# Patient Record
Sex: MALE | Race: ASIAN | NOT HISPANIC OR LATINO
[De-identification: names, ages, dates, MRNs, and addresses within clinical notes are randomized per-mention and may not be internally consistent; named-entity substitution may affect disease eponyms.]

---

## 2024-01-09 PROBLEM — Z00.00 ENCOUNTER FOR PREVENTIVE HEALTH EXAMINATION: Status: ACTIVE | Noted: 2024-01-09

## 2024-01-26 ENCOUNTER — APPOINTMENT (OUTPATIENT)
Dept: UROLOGY | Facility: CLINIC | Age: 64
End: 2024-01-26
Payer: COMMERCIAL

## 2024-01-26 VITALS
OXYGEN SATURATION: 96 % | TEMPERATURE: 98 F | BODY MASS INDEX: 25.27 KG/M2 | HEIGHT: 64 IN | RESPIRATION RATE: 16 BRPM | SYSTOLIC BLOOD PRESSURE: 151 MMHG | HEART RATE: 66 BPM | WEIGHT: 148 LBS | DIASTOLIC BLOOD PRESSURE: 83 MMHG

## 2024-01-26 DIAGNOSIS — Z87.891 PERSONAL HISTORY OF NICOTINE DEPENDENCE: ICD-10-CM

## 2024-01-26 DIAGNOSIS — Z86.39 PERSONAL HISTORY OF OTHER ENDOCRINE, NUTRITIONAL AND METABOLIC DISEASE: ICD-10-CM

## 2024-01-26 DIAGNOSIS — Z78.9 OTHER SPECIFIED HEALTH STATUS: ICD-10-CM

## 2024-01-26 DIAGNOSIS — Z80.49 FAMILY HISTORY OF MALIGNANT NEOPLASM OF OTHER GENITAL ORGANS: ICD-10-CM

## 2024-01-26 PROCEDURE — 99204 OFFICE O/P NEW MOD 45 MIN: CPT

## 2024-01-26 RX ORDER — GLIMEPIRIDE 2 MG/1
2 TABLET ORAL
Refills: 0 | Status: ACTIVE | COMMUNITY

## 2024-01-26 RX ORDER — LOSARTAN POTASSIUM 25 MG/1
25 TABLET, FILM COATED ORAL
Refills: 0 | Status: ACTIVE | COMMUNITY

## 2024-01-26 RX ORDER — METFORMIN HYDROCHLORIDE 1000 MG/1
1000 TABLET, COATED ORAL
Refills: 0 | Status: ACTIVE | COMMUNITY

## 2024-01-26 RX ORDER — ATORVASTATIN CALCIUM 10 MG/1
10 TABLET, FILM COATED ORAL
Refills: 0 | Status: ACTIVE | COMMUNITY

## 2024-01-26 NOTE — HISTORY OF PRESENT ILLNESS
[FreeTextEntry1] : 64 yo Pashto speaking M presented to outside hospital ([facility] [place]) with severe right flank pain, vomiting  CT (1/6/24) showed 1cm right mid ureteral stone with hydronephrosis Initially discharged but returned to ER the next day due to symptoms and eventually had ureteral stent inserted Feeling much better some intermittent hematuria no fever or chills Drinks 1 bottle of water, 1 cup of coffee daily

## 2024-01-26 NOTE — ASSESSMENT
[FreeTextEntry1] : 64 yo M with right ureteral stone s/p stent insertion at outside hospital  - Reviewed records from hospitalization including CT report stating mid ureteral stone as well as normal BMP and CBC - -I discussed the different treatment modalities for nephrolithiasis with the patient, including medical management, spontaneous stone passage, percutaneous stone extraction, extracorporeal shock wave lithotripsy, and ureteroscopy with laser lithotripsy and stone extraction. Given the size and location of the stone, I recommend and the patient opted to proceed with ureteroscopy. The risks, benefits, and alternatives to ureteroscopy were discussed with the patient, including but not limited to pain, infection, bleeding, bladder injury, ureteral injury, renal injury, and treatment failure. I also discussed the possible need for a temporary ureteral stent. I discussed the possible side effects of a temporary ureteral stent, including flank pain, hematuria, and bladder spasms. The patient understands that a stent is a temporary implant that must be removed in the future. The patient wishes to proceed and we will schedule the surgery for the near future.  - WIll need CT prior to procedure - UA and culture

## 2024-01-26 NOTE — REVIEW OF SYSTEMS
[Abdominal Pain] : abdominal pain [Blood in urine that you can see] : blood visible in urine [Bladder fullness after urinating] : bladder fullness after urinating [History of kidney stones] : history of kidney stones [Muscle Weakness] : muscle weakness [Negative] : Heme/Lymph

## 2024-01-31 LAB
APPEARANCE: ABNORMAL
BACTERIA UR CULT: NORMAL
BACTERIA: NEGATIVE /HPF
BILIRUBIN URINE: NEGATIVE
BLOOD URINE: ABNORMAL
CALCIUM OXALATE CRYSTALS: PRESENT
COLOR: ABNORMAL
GLUCOSE QUALITATIVE U: >=1000 MG/DL
KETONES URINE: ABNORMAL MG/DL
LEUKOCYTE ESTERASE URINE: ABNORMAL
MICROSCOPIC-UA: NORMAL
NITRITE URINE: NEGATIVE
PH URINE: 7.5
PROTEIN URINE: 100 MG/DL
RED BLOOD CELLS URINE: ABNORMAL /HPF
SPECIFIC GRAVITY URINE: 1.02
URINE COMMENTS: NORMAL
UROBILINOGEN URINE: 1 MG/DL
WHITE BLOOD CELLS URINE: 15 /HPF

## 2024-02-07 ENCOUNTER — OUTPATIENT (OUTPATIENT)
Dept: OUTPATIENT SERVICES | Facility: HOSPITAL | Age: 64
LOS: 1 days | End: 2024-02-07
Payer: COMMERCIAL

## 2024-02-07 VITALS
RESPIRATION RATE: 17 BRPM | DIASTOLIC BLOOD PRESSURE: 80 MMHG | OXYGEN SATURATION: 100 % | WEIGHT: 147.93 LBS | TEMPERATURE: 98 F | HEIGHT: 67 IN | SYSTOLIC BLOOD PRESSURE: 128 MMHG | HEART RATE: 65 BPM

## 2024-02-07 DIAGNOSIS — E78.5 HYPERLIPIDEMIA, UNSPECIFIED: ICD-10-CM

## 2024-02-07 DIAGNOSIS — N20.0 CALCULUS OF KIDNEY: ICD-10-CM

## 2024-02-07 DIAGNOSIS — E11.9 TYPE 2 DIABETES MELLITUS WITHOUT COMPLICATIONS: ICD-10-CM

## 2024-02-07 DIAGNOSIS — Z90.49 ACQUIRED ABSENCE OF OTHER SPECIFIED PARTS OF DIGESTIVE TRACT: Chronic | ICD-10-CM

## 2024-02-07 DIAGNOSIS — Z01.818 ENCOUNTER FOR OTHER PREPROCEDURAL EXAMINATION: ICD-10-CM

## 2024-02-07 DIAGNOSIS — Z78.9 OTHER SPECIFIED HEALTH STATUS: ICD-10-CM

## 2024-02-07 DIAGNOSIS — I10 ESSENTIAL (PRIMARY) HYPERTENSION: ICD-10-CM

## 2024-02-07 LAB
APPEARANCE UR: ABNORMAL
BACTERIA # UR AUTO: ABNORMAL /HPF
BILIRUB UR-MCNC: NEGATIVE — SIGNIFICANT CHANGE UP
COD CRY URNS QL: PRESENT
COLOR SPEC: SIGNIFICANT CHANGE UP
COMMENT - URINE: SIGNIFICANT CHANGE UP
DIFF PNL FLD: ABNORMAL
GLUCOSE UR QL: >=1000 MG/DL
HYALINE CASTS # UR AUTO: PRESENT
KETONES UR-MCNC: ABNORMAL MG/DL
LEUKOCYTE ESTERASE UR-ACNC: ABNORMAL
NITRITE UR-MCNC: NEGATIVE — SIGNIFICANT CHANGE UP
PH UR: 6 — SIGNIFICANT CHANGE UP (ref 5–8)
PROT UR-MCNC: 100 MG/DL
RBC CASTS # UR COMP ASSIST: ABNORMAL /HPF
SP GR SPEC: 1.02 — SIGNIFICANT CHANGE UP (ref 1–1.03)
UROBILINOGEN FLD QL: 1 MG/DL — SIGNIFICANT CHANGE UP (ref 0.2–1)
WBC UR QL: 15 /HPF — HIGH (ref 0–5)

## 2024-02-07 RX ORDER — SODIUM CHLORIDE 9 MG/ML
3 INJECTION INTRAMUSCULAR; INTRAVENOUS; SUBCUTANEOUS EVERY 8 HOURS
Refills: 0 | Status: DISCONTINUED | OUTPATIENT
Start: 2024-02-14 | End: 2024-02-28

## 2024-02-07 NOTE — H&P PST ADULT - HISTORY OF PRESENT ILLNESS
64 yr old Bulgarian male pleasant accompanied by daughter in law Josef. Pt has history of HTN, HDL diabetes had gone to this ER 1/6/2024 with right flank pain , vomiting and bloody urine. CT Scan showed fright mid ureteral stone with hydronephrosis and right ureteral stent was placed to relieve his symptoms. Pt scheduled for cystoscopy right retrograde pyelogram ureteroscopy with laser lithotripsy and ureteral stent exchange on 2/14/2024. All history obtained via [facility] as .

## 2024-02-07 NOTE — H&P PST ADULT - NSICDXPROCEDURE_GEN_ALL_CORE_FT
PROCEDURES:  Cystoscopy with ureteroscopic laser lithotripsy and insertion of stent 07-Feb-2024 17:10:06  Stephenie Barbosa

## 2024-02-07 NOTE — H&P PST ADULT - NSICDXPASTMEDICALHX_GEN_ALL_CORE_FT
PAST MEDICAL HISTORY:  Calculus of kidney     DM (diabetes mellitus)     HLD (hyperlipidemia)     HTN (hypertension)     Language barrier to communication

## 2024-02-07 NOTE — H&P PST ADULT - PROBLEM SELECTOR PLAN 2
Pt instructed not to take any oral glycemics am of surgery but continue with them post procedure. Monitor FS pre op

## 2024-02-07 NOTE — H&P PST ADULT - PROBLEM SELECTOR PLAN 5
scheduled for cystoscopy right retrograde pyelogram ureteroscopy with laser lithotripsy and ureteral stent exchange.    Pt instructed to be NPO the night before and the morning of surgery. Provided with chlorhexidene 4% solution to wash for 3 days including the morning of surgery. Written instructions reviewed with pt and family member. Escort required upon discharge. Tylenol  only to be used if needed prior to surgery.      HAYDE=3

## 2024-02-07 NOTE — H&P PST ADULT - NSICDXFAMILYHX_GEN_ALL_CORE_FT
FAMILY HISTORY:  Father  Still living? Unknown  Family history of cervical cancer, Age at diagnosis: Age Unknown    Mother  Still living? No  Family history of cervical cancer, Age at diagnosis: Age Unknown

## 2024-02-07 NOTE — H&P PST ADULT - REASON FOR ADMISSION
cystoscopy right retrograde pyelogram ureteroscopy with laser lithotripsy and ureteral stent exchange

## 2024-02-07 NOTE — H&P PST ADULT - PROBLEM SELECTOR PLAN 1
Pt take his Losartan at night and will continue the night before surgery. Monitor vs and pt F/U with PCP

## 2024-02-07 NOTE — H&P PST ADULT - ASSESSMENT
64 yr old Persian male pleasant accompanied by daughter in law Josef. Pt has history of HTN, HDL diabetes had gone to this ER 1/6/2024 with right flank pain , vomiting and bloody urine. CT Scan showed fright mid ureteral stone with hydronephrosis and right ureteral stent was placed to relieve his symptoms. Pt scheduled for cystoscopy right retrograde pyelogram ureteroscopy with laser lithotripsy and ureteral stent exchange on 2/14/2024. All history obtained via [facility] as .

## 2024-02-08 PROCEDURE — G0463: CPT

## 2024-02-09 LAB
CULTURE RESULTS: SIGNIFICANT CHANGE UP
SPECIMEN SOURCE: SIGNIFICANT CHANGE UP

## 2024-02-14 ENCOUNTER — RESULT REVIEW (OUTPATIENT)
Age: 64
End: 2024-02-14

## 2024-02-14 ENCOUNTER — OUTPATIENT (OUTPATIENT)
Dept: OUTPATIENT SERVICES | Facility: HOSPITAL | Age: 64
LOS: 1 days | End: 2024-02-14
Payer: COMMERCIAL

## 2024-02-14 ENCOUNTER — APPOINTMENT (OUTPATIENT)
Age: 64
End: 2024-02-14

## 2024-02-14 ENCOUNTER — TRANSCRIPTION ENCOUNTER (OUTPATIENT)
Age: 64
End: 2024-02-14

## 2024-02-14 VITALS
SYSTOLIC BLOOD PRESSURE: 123 MMHG | WEIGHT: 147.93 LBS | HEIGHT: 67 IN | HEART RATE: 76 BPM | DIASTOLIC BLOOD PRESSURE: 76 MMHG | OXYGEN SATURATION: 99 % | RESPIRATION RATE: 16 BRPM | TEMPERATURE: 98 F

## 2024-02-14 VITALS
OXYGEN SATURATION: 96 % | DIASTOLIC BLOOD PRESSURE: 85 MMHG | SYSTOLIC BLOOD PRESSURE: 137 MMHG | HEART RATE: 67 BPM | RESPIRATION RATE: 14 BRPM | TEMPERATURE: 98 F

## 2024-02-14 DIAGNOSIS — Z90.49 ACQUIRED ABSENCE OF OTHER SPECIFIED PARTS OF DIGESTIVE TRACT: Chronic | ICD-10-CM

## 2024-02-14 DIAGNOSIS — N20.0 CALCULUS OF KIDNEY: ICD-10-CM

## 2024-02-14 DIAGNOSIS — Z01.818 ENCOUNTER FOR OTHER PREPROCEDURAL EXAMINATION: ICD-10-CM

## 2024-02-14 LAB
GLUCOSE BLDC GLUCOMTR-MCNC: 125 MG/DL — HIGH (ref 70–99)
GLUCOSE BLDC GLUCOMTR-MCNC: 145 MG/DL — HIGH (ref 70–99)

## 2024-02-14 PROCEDURE — 74420 UROGRAPHY RTRGR +-KUB: CPT | Mod: 26

## 2024-02-14 PROCEDURE — 82365 CALCULUS SPECTROSCOPY: CPT

## 2024-02-14 PROCEDURE — C1758: CPT

## 2024-02-14 PROCEDURE — 52356 CYSTO/URETERO W/LITHOTRIPSY: CPT | Mod: RT

## 2024-02-14 PROCEDURE — 76000 FLUOROSCOPY <1 HR PHYS/QHP: CPT

## 2024-02-14 PROCEDURE — 88300 SURGICAL PATH GROSS: CPT

## 2024-02-14 PROCEDURE — C1889: CPT

## 2024-02-14 PROCEDURE — 82962 GLUCOSE BLOOD TEST: CPT

## 2024-02-14 PROCEDURE — C2617: CPT

## 2024-02-14 PROCEDURE — 88300 SURGICAL PATH GROSS: CPT | Mod: 26

## 2024-02-14 DEVICE — URETERAL STENT PERCUFLEX PLUS 6FR 26CM: Type: IMPLANTABLE DEVICE | Site: RIGHT | Status: FUNCTIONAL

## 2024-02-14 DEVICE — URETERAL CATH OPEN END FLEXI-TIP 5FR .038" X 70CM: Type: IMPLANTABLE DEVICE | Site: RIGHT | Status: FUNCTIONAL

## 2024-02-14 DEVICE — STONE BASKET ZEROTIP NITINOL 4-WIRE 1.9FR 120CM X 12MM: Type: IMPLANTABLE DEVICE | Site: RIGHT | Status: FUNCTIONAL

## 2024-02-14 DEVICE — URETERAL STENT PERCUFLEX PLUS 6FR 24CM: Type: IMPLANTABLE DEVICE | Site: RIGHT | Status: FUNCTIONAL

## 2024-02-14 DEVICE — LASER FIBER MOSES 365 D/F/L: Type: IMPLANTABLE DEVICE | Site: RIGHT | Status: FUNCTIONAL

## 2024-02-14 DEVICE — URETERAL CATH OPEN END 5FR 70CM: Type: IMPLANTABLE DEVICE | Site: RIGHT | Status: FUNCTIONAL

## 2024-02-14 DEVICE — STENT URETHRAL PIGTL DBL 6FRX22CM: Type: IMPLANTABLE DEVICE | Site: RIGHT | Status: FUNCTIONAL

## 2024-02-14 DEVICE — GUIDEWIRE SENSOR DUAL-FLEX NITINOL STRAIGHT .035" X 150CM: Type: IMPLANTABLE DEVICE | Site: RIGHT | Status: FUNCTIONAL

## 2024-02-14 RX ORDER — LOSARTAN POTASSIUM 100 MG/1
1 TABLET, FILM COATED ORAL
Refills: 0 | DISCHARGE

## 2024-02-14 RX ORDER — TAMSULOSIN HYDROCHLORIDE 0.4 MG/1
1 CAPSULE ORAL
Qty: 14 | Refills: 0
Start: 2024-02-14 | End: 2024-02-27

## 2024-02-14 RX ORDER — METFORMIN HYDROCHLORIDE 850 MG/1
1 TABLET ORAL
Refills: 0 | DISCHARGE

## 2024-02-14 RX ORDER — GLIMEPIRIDE 1 MG
1 TABLET ORAL
Refills: 0 | DISCHARGE

## 2024-02-14 RX ORDER — ATORVASTATIN CALCIUM 80 MG/1
1 TABLET, FILM COATED ORAL
Refills: 0 | DISCHARGE

## 2024-02-14 RX ORDER — HYDROMORPHONE HYDROCHLORIDE 2 MG/ML
0.5 INJECTION INTRAMUSCULAR; INTRAVENOUS; SUBCUTANEOUS
Refills: 0 | Status: DISCONTINUED | OUTPATIENT
Start: 2024-02-14 | End: 2024-02-14

## 2024-02-14 RX ORDER — FENTANYL CITRATE 50 UG/ML
25 INJECTION INTRAVENOUS
Refills: 0 | Status: DISCONTINUED | OUTPATIENT
Start: 2024-02-14 | End: 2024-02-14

## 2024-02-14 RX ORDER — ACETAMINOPHEN 500 MG
1000 TABLET ORAL ONCE
Refills: 0 | Status: DISCONTINUED | OUTPATIENT
Start: 2024-02-14 | End: 2024-02-14

## 2024-02-14 NOTE — ASU DISCHARGE PLAN (ADULT/PEDIATRIC) - CARE PROVIDER_API CALL
[facility]  Urology  [address], [address] [place] [address]  Phone: [phone]  Fax: [phone]  Follow Up Time: 2 weeks

## 2024-02-14 NOTE — ASU DISCHARGE PLAN (ADULT/PEDIATRIC) - ASU DC SPECIAL INSTRUCTIONSFT
STENT: You may have an internal stent (a hollow tube that runs from the kidney to your bladder) after your procedure, which helps urine drain from the kidney to your bladder. Some patients experience urinary frequency, burning, or even back pain (especially with urination). These sensations will gradually get better. Increasing your fluid intake can also improve these symptoms. While the stent is in place, your urine may continue to be bloody. This stent is temporary and must be removed by your urologist as an outpatient with in 3 months unless otherwise specified. If your stent is on a string, it is secured to your leg or genitalia with an adhesive bandage. Do not pull on the string, do not remove the bandage, do not insert anything intravaginally/intraurethrally, and do not engage in sexual intercourse until after the stent is removed at your post-operative appointment.  GENERAL: It is common to have blood in your urine after your procedure. It may be pink or even red; inform your doctor if you have a significant amount of clot in the urine or if you are unable to void at all. The urine may clear and then become bloody again especially as you are more physically active.  BATHING: You may shower or bathe.  DIET: You may resume your regular diet and regular medication regimen.  PAIN: You may take Tylenol (acetaminophen) 650-975mg and/or Motrin (ibuprofen) 400-600mg, both available over the counter, for pain every 6 hours as needed. Do not exceed 4000mg of Tylenol (acetaminophen) daily. You may alternate these medications such that you take one or the other every 3 hours for around the clock pain coverage. If you have a stent, the following medications may have been sent to your pharmacy for stent related discomfort: Flomax (tamsulosin) 0.4mg at bedtime until stent removed,  ACTIVITY: No heavy lifting or strenuous exercise until you are evaluated at your post-operative appointment. Otherwise, you may return to your usual level of physical activity.  ANTICOAGULATION: If you are taking any blood thinning medications, please discuss with your urologist prior to restarting these medications unless otherwise specified.  FOLLOW-UP: If you did not already schedule your post-operative appointment, please call your urologist to schedule and follow-up appointment.  CALL YOUR UROLOGIST IF: You have any bleeding that does not stop, inability to void >8 hours, fever over 100.4 F, chills, persistent nausea/vomiting, changes in your incision concerning for infection, or if your pain is not controlled on your discharge pain medications.

## 2024-02-14 NOTE — ASU PATIENT PROFILE, ADULT - FALL HARM RISK - UNIVERSAL INTERVENTIONS
Bed in lowest position, wheels locked, appropriate side rails in place/Call bell, personal items and telephone in reach/Instruct patient to call for assistance before getting out of bed or chair/Non-slip footwear when patient is out of bed/[place] to call system/Physically safe environment - no spills, clutter or unnecessary equipment/Purposeful Proactive Rounding/Room/bathroom lighting operational, light cord in reach

## 2024-02-14 NOTE — BRIEF OPERATIVE NOTE - OPERATION/FINDINGS
Cystoscopy, removal of right stent, right ureteroscopy with laser lithotripsy removal of right ureteral stone and placement of right ureteral stent    Please see complete op note

## 2024-02-14 NOTE — BRIEF OPERATIVE NOTE - NSICDXBRIEFPROCEDURE_GEN_ALL_CORE_FT
PROCEDURES:  Lithotripsy with insertion of right ureteral stent 14-Feb-2024 09:07:08  Noelle Estrada

## 2024-02-14 NOTE — ASU DISCHARGE PLAN (ADULT/PEDIATRIC) - NS MD DC FALL RISK RISK
For information on Fall & Injury Prevention, visit: [email or web address] OR  [email or web address] OR  https://www.cdc.gov/steadi/patient.html

## 2024-02-21 ENCOUNTER — APPOINTMENT (OUTPATIENT)
Age: 64
End: 2024-02-21
Payer: COMMERCIAL

## 2024-02-21 VITALS
TEMPERATURE: 97.7 F | HEART RATE: 58 BPM | HEIGHT: 64 IN | BODY MASS INDEX: 25.27 KG/M2 | WEIGHT: 148 LBS | SYSTOLIC BLOOD PRESSURE: 139 MMHG | OXYGEN SATURATION: 98 % | DIASTOLIC BLOOD PRESSURE: 82 MMHG | RESPIRATION RATE: 16 BRPM

## 2024-02-21 PROBLEM — Z78.9 OTHER SPECIFIED HEALTH STATUS: Chronic | Status: ACTIVE | Noted: 2024-02-07

## 2024-02-21 PROBLEM — E78.5 HYPERLIPIDEMIA, UNSPECIFIED: Chronic | Status: ACTIVE | Noted: 2024-02-07

## 2024-02-21 PROBLEM — I10 ESSENTIAL (PRIMARY) HYPERTENSION: Chronic | Status: ACTIVE | Noted: 2024-02-07

## 2024-02-21 PROBLEM — N20.0 CALCULUS OF KIDNEY: Chronic | Status: ACTIVE | Noted: 2024-02-07

## 2024-02-21 PROBLEM — E11.9 TYPE 2 DIABETES MELLITUS WITHOUT COMPLICATIONS: Chronic | Status: ACTIVE | Noted: 2024-02-07

## 2024-02-21 PROCEDURE — 52310 CYSTOSCOPY AND TREATMENT: CPT

## 2024-02-24 LAB
CELL MATERIAL STONE EST-MCNT: SIGNIFICANT CHANGE UP
LABORATORY COMMENT REPORT: SIGNIFICANT CHANGE UP
NIDUS STONE QN: SIGNIFICANT CHANGE UP

## 2024-02-28 LAB — SURGICAL PATHOLOGY STUDY: SIGNIFICANT CHANGE UP

## 2024-03-22 ENCOUNTER — APPOINTMENT (OUTPATIENT)
Age: 64
End: 2024-03-22
Payer: COMMERCIAL

## 2024-03-22 DIAGNOSIS — N20.0 CALCULUS OF KIDNEY: ICD-10-CM

## 2024-03-22 PROCEDURE — 99213 OFFICE O/P EST LOW 20 MIN: CPT

## 2024-03-30 PROBLEM — N20.0 NEPHROLITHIASIS: Status: ACTIVE | Noted: 2024-01-26

## 2024-03-30 NOTE — HISTORY OF PRESENT ILLNESS
[FreeTextEntry1] : 64 yo Azeri speaking M presented to outside hospital ([facility] [place]) with severe right flank pain, vomiting  CT (1/6/24) showed 1cm right mid ureteral stone with hydronephrosis Initially discharged but returned to ER the next day due to symptoms and eventually had ureteral stent inserted Feeling much better some intermittent hematuria no fever or chills Drinks 1 bottle of water, 1 cup of coffee daily  3/22/24 Interval history: Pt is s/p URs/HLL with subsequent stent removal Doing well since stent removal - no more hematuria Drinks 4 cups of water, 1 cup of coffee daily Chemical analysis results - 70% Calcium oxalate monohydrate. 20% Calcium oxalate dihydrate.  10% Calcium phosphate (apatite).

## 2024-03-30 NOTE — ASSESSMENT
[FreeTextEntry1] : 65 yo M with nephrolithiasis s/p URS/HLL  - Reviewed stone analysis and discussed diet and behavioral modifications - renal US in 6 months

## 2024-03-30 NOTE — PHYSICAL EXAM
[Well Groomed] : well groomed [Normal Appearance] : normal appearance [General Appearance - In No Acute Distress] : no acute distress [Edema] : no peripheral edema [Exaggerated Use Of Accessory Muscles For Inspiration] : no accessory muscle use [Respiration, Rhythm And Depth] : normal respiratory rhythm and effort [Abdomen Soft] : soft [Abdomen Tenderness] : non-tender [Costovertebral Angle Tenderness] : no ~M costovertebral angle tenderness [Normal Station and Gait] : the gait and station were normal for the patient's age [] : no rash [No Focal Deficits] : no focal deficits [Affect] : the affect was normal [Oriented To Time, Place, And Person] : oriented to person, place, and time [No Palpable Adenopathy] : no palpable adenopathy [Mood] : the mood was normal

## 2024-09-25 ENCOUNTER — APPOINTMENT (OUTPATIENT)
Age: 64
End: 2024-09-25

## 2024-09-25 ENCOUNTER — APPOINTMENT (OUTPATIENT)
Age: 64
End: 2024-09-25
Payer: COMMERCIAL

## 2024-09-25 DIAGNOSIS — N20.0 CALCULUS OF KIDNEY: ICD-10-CM

## 2024-09-25 PROCEDURE — 76775 US EXAM ABDO BACK WALL LIM: CPT

## (undated) DEVICE — GLV 6.5 PROTEXIS (WHITE)

## (undated) DEVICE — VENODYNE/SCD SLEEVE CALF MEDIUM

## (undated) DEVICE — PACK CYSTO

## (undated) DEVICE — WARMING BLANKET UPPER ADULT

## (undated) DEVICE — SYR LUER LOK 10CC

## (undated) DEVICE — SOL IRR POUR H2O 500ML

## (undated) DEVICE — GOWN XL

## (undated) DEVICE — SOL IRR POUR NS 0.9% 500ML

## (undated) DEVICE — SOL IRR BAG NS 0.9% 3000ML

## (undated) DEVICE — SYR LUER LOK 20CC

## (undated) DEVICE — DRAPE C ARM UNIVERSAL